# Patient Record
Sex: MALE | Race: OTHER | Employment: FULL TIME | URBAN - METROPOLITAN AREA
[De-identification: names, ages, dates, MRNs, and addresses within clinical notes are randomized per-mention and may not be internally consistent; named-entity substitution may affect disease eponyms.]

---

## 2020-12-05 ENCOUNTER — HOSPITAL ENCOUNTER (EMERGENCY)
Age: 25
Discharge: HOME | End: 2020-12-05
Attending: EMERGENCY MEDICINE

## 2020-12-05 VITALS
HEIGHT: 66 IN | RESPIRATION RATE: 18 BRPM | TEMPERATURE: 98.7 F | DIASTOLIC BLOOD PRESSURE: 82 MMHG | OXYGEN SATURATION: 99 % | BODY MASS INDEX: 24.11 KG/M2 | WEIGHT: 150 LBS | SYSTOLIC BLOOD PRESSURE: 124 MMHG | HEART RATE: 70 BPM

## 2020-12-05 PROCEDURE — 99283 EMERGENCY DEPT VISIT LOW MDM: CPT

## 2020-12-05 PROCEDURE — U0003 INFECTIOUS AGENT DETECTION BY NUCLEIC ACID (DNA OR RNA); SEVERE ACUTE RESPIRATORY SYNDROME CORONAVIRUS 2 (SARS-COV-2) (CORONAVIRUS DISEASE [COVID-19]), AMPLIFIED PROBE TECHNIQUE, MAKING USE OF HIGH THROUGHPUT TECHNOLOGIES AS DESCRIBED BY CMS-2020-01-R: HCPCS

## 2020-12-05 SDOH — HEALTH STABILITY: MENTAL HEALTH: HOW OFTEN DO YOU HAVE A DRINK CONTAINING ALCOHOL?: NEVER

## 2020-12-05 ASSESSMENT — ENCOUNTER SYMPTOMS
COUGH: 0
CONSTIPATION: 0
SHORTNESS OF BREATH: 0
NAUSEA: 0
PHOTOPHOBIA: 0
BLOOD IN STOOL: 0
VOMITING: 0
ABDOMINAL PAIN: 0
TROUBLE SWALLOWING: 0
RHINORRHEA: 0
DIARRHEA: 0
SORE THROAT: 0

## 2020-12-05 NOTE — ED NOTES
Discharge instructions reviewed with patient, no additional comments or concerns.       Soraida Martinez RN  12/05/20 0454

## 2020-12-05 NOTE — ED NOTES
PT presents to the ED ambulatory from home. Pt reports he is concerned that he may have COVID and does not want to return to work until he knows wether he is COVID + or not. Pt denies any shortness of breath cough, chest pain or fevers. PT reports that he has been very fatigues sleeping a lot over the past 2 days and has had loss of taste and smell x2 days.       Lady Martinez, RN  12/05/20 9926

## 2020-12-05 NOTE — ED PROVIDER NOTES
[address] [facility] RESIDENT NOTE       Date of evaluation: 12/5/2020    Chief Complaint     Concern For COVID-19      History of Present Illness     Harjinder Caraballo is a 26 y.o. male who presents with lost of taste and smell. Symptoms started two days ago with fatigue and lost of taste and smell. His neighbor has covid and he works at [employer]. He would like to know if he has covid prior to going back to work. He has no cough, shortness of breath, chest pain. No nausea or vomiting or diarrhea. He thinks he had a fever yesterday. Review of Systems     Review of Systems   Constitutional: Positive for activity change, chills and fatigue. Negative for appetite change and diaphoresis. HENT: Negative for congestion, rhinorrhea, sore throat and trouble swallowing. Eyes: Negative for photophobia and visual disturbance. Respiratory: Negative for cough and shortness of breath. Cardiovascular: Negative for chest pain and palpitations. Gastrointestinal: Negative for abdominal pain, blood in stool, constipation, diarrhea, nausea and vomiting. Genitourinary: Negative for difficulty urinating and dysuria. Musculoskeletal: Negative for arthralgias and myalgias. Skin: Negative for rash. Neurological: Negative for weakness, numbness and headaches. Psychiatric/Behavioral: Negative for self-injury and suicidal ideas. The patient is not nervous/anxious. Past Medical, Surgical, Family, and Social History     He has no past medical history on file. He has no past surgical history on file. His family history is not on file. He reports that he has never smoked. He has never used smokeless tobacco. He reports that he does not drink alcohol or use drugs. Medications     Previous Medications    No medications on file       Allergies     He has no allergies on file.     Physical Exam     INITIAL VITALS: BP: 124/82, Temp: 98.7 °F (37.1 °C), Pulse: 70, Resp: 18, SpO2: 99 %   Physical Exam  Constitutional:       General: He is not in acute distress. Appearance: Normal appearance. He is well-developed. He is not diaphoretic. HENT:      Head: Normocephalic and atraumatic. Nose: Nose normal. No congestion or rhinorrhea. Mouth/Throat:      Mouth: Mucous membranes are moist.   Eyes:      Extraocular Movements: Extraocular movements intact. Conjunctiva/sclera: Conjunctivae normal.      Pupils: Pupils are equal, round, and reactive to light. Neck:      Musculoskeletal: Normal range of motion. No neck rigidity or muscular tenderness. Cardiovascular:      Rate and Rhythm: Normal rate and regular rhythm. Pulmonary:      Effort: Pulmonary effort is normal. No respiratory distress. Breath sounds: Normal breath sounds. Comments: Breathing comfortably on room air   Abdominal:      General: Abdomen is flat. There is no distension. Palpations: Abdomen is soft. Tenderness: There is no abdominal tenderness. Musculoskeletal: Normal range of motion. General: No deformity. Lymphadenopathy:      Cervical: No cervical adenopathy. Skin:     General: Skin is warm. Capillary Refill: Capillary refill takes less than 2 seconds. Findings: No rash. Neurological:      General: No focal deficit present. Mental Status: He is alert and oriented to person, place, and time. Mental status is at baseline. Cranial Nerves: No cranial nerve deficit. Coordination: Coordination normal.         DiagnosticResults         RADIOLOGY:  No orders to display       LABS:   No results found for this visit on 12/05/20. ED BEDSIDE ULTRASOUND:      RECENT VITALS:  BP: 124/82, Temp: 98.7 °F (37.1 °C), Pulse: 70,Resp: 18, SpO2: 99 %     Procedures         ED Course     Nursing Notes, Past Medical Hx, Past Surgical Hx, Social Hx, Allergies, and Family Hx were reviewed.     The patient was given the followingmedications:  No orders of the defined types were placed in this encounter. CONSULTS:  None    MEDICAL DECISION MAKING / ASSESSMENT / PLAN     Nidhi So is a 26 y.o. male who presents with concern for covid-19. On presentation he was in no acute distress and hemodynamically stable. Breathing comfortably on room air. Covid-19 testing done. He was instructed to self quarantine while symptomatic. He was given strict return precautions for worsening symptoms and discharged in stable condition. This patient was also evaluated by the attending physician. All care plans werediscussed and agreed upon. Clinical Impression     1. Clinical diagnosis of COVID-19    2. Loss of taste    3. Fatigue, unspecified type        Disposition     PATIENT REFERRED TO:  No follow-up provider specified.     DISCHARGE MEDICATIONS:  New Prescriptions    No medications on file       Sarmad Galvan MD  Resident  12/05/20 3054

## 2020-12-06 LAB — SARS-COV-2, PCR: DETECTED

## 2020-12-07 ENCOUNTER — CARE COORDINATION (OUTPATIENT)
Dept: CARE COORDINATION | Age: 25
End: 2020-12-07

## 2020-12-07 NOTE — CARE COORDINATION
Patient/family/caregiver given information for [employer] and agrees to enroll yes  Patient's preferred e-mail: [email or web address]   Patient's preferred phone number: [phone]  Based on Loop alert triggers, patient will be contacted by nurse care manager for worsening symptoms. Pt will be further monitored by COVID Loop Team based on severity of symptoms and risk factors.

## 2021-08-14 ENCOUNTER — HOSPITAL ENCOUNTER (EMERGENCY)
Age: 26
Discharge: HOME | End: 2021-08-14
Attending: EMERGENCY MEDICINE

## 2021-08-14 VITALS
TEMPERATURE: 98.2 F | SYSTOLIC BLOOD PRESSURE: 101 MMHG | OXYGEN SATURATION: 99 % | DIASTOLIC BLOOD PRESSURE: 65 MMHG | HEART RATE: 88 BPM | RESPIRATION RATE: 20 BRPM

## 2021-08-14 DIAGNOSIS — J06.9 VIRAL URI: Primary | ICD-10-CM

## 2021-08-14 LAB
MONO TEST: NEGATIVE
RAPID INFLUENZA  B AGN: NEGATIVE
RAPID INFLUENZA A AGN: NEGATIVE
S PYO AG THROAT QL: NEGATIVE

## 2021-08-14 PROCEDURE — 86308 HETEROPHILE ANTIBODY SCREEN: CPT

## 2021-08-14 PROCEDURE — 2580000003 HC RX 258: Performed by: PHYSICIAN ASSISTANT

## 2021-08-14 PROCEDURE — 87077 CULTURE AEROBIC IDENTIFY: CPT

## 2021-08-14 PROCEDURE — 87880 STREP A ASSAY W/OPTIC: CPT

## 2021-08-14 PROCEDURE — 99284 EMERGENCY DEPT VISIT MOD MDM: CPT

## 2021-08-14 PROCEDURE — 6360000002 HC RX W HCPCS: Performed by: PHYSICIAN ASSISTANT

## 2021-08-14 PROCEDURE — 87804 INFLUENZA ASSAY W/OPTIC: CPT

## 2021-08-14 PROCEDURE — 87081 CULTURE SCREEN ONLY: CPT

## 2021-08-14 PROCEDURE — U0003 INFECTIOUS AGENT DETECTION BY NUCLEIC ACID (DNA OR RNA); SEVERE ACUTE RESPIRATORY SYNDROME CORONAVIRUS 2 (SARS-COV-2) (CORONAVIRUS DISEASE [COVID-19]), AMPLIFIED PROBE TECHNIQUE, MAKING USE OF HIGH THROUGHPUT TECHNOLOGIES AS DESCRIBED BY CMS-2020-01-R: HCPCS

## 2021-08-14 PROCEDURE — U0005 INFEC AGEN DETEC AMPLI PROBE: HCPCS

## 2021-08-14 PROCEDURE — 96375 TX/PRO/DX INJ NEW DRUG ADDON: CPT

## 2021-08-14 PROCEDURE — 96374 THER/PROPH/DIAG INJ IV PUSH: CPT

## 2021-08-14 RX ORDER — ACETAMINOPHEN 500 MG
500 TABLET ORAL 4 TIMES DAILY PRN
Qty: 30 TABLET | Refills: 0 | Status: SHIPPED | OUTPATIENT
Start: 2021-08-14 | End: 2022-10-02

## 2021-08-14 RX ORDER — DEXAMETHASONE SODIUM PHOSPHATE 4 MG/ML
10 INJECTION, SOLUTION INTRA-ARTICULAR; INTRALESIONAL; INTRAMUSCULAR; INTRAVENOUS; SOFT TISSUE EVERY 6 HOURS
Status: DISCONTINUED | OUTPATIENT
Start: 2021-08-14 | End: 2021-08-14 | Stop reason: HOSPADM

## 2021-08-14 RX ORDER — SODIUM CHLORIDE, SODIUM LACTATE, POTASSIUM CHLORIDE, CALCIUM CHLORIDE 600; 310; 30; 20 MG/100ML; MG/100ML; MG/100ML; MG/100ML
1000 INJECTION, SOLUTION INTRAVENOUS ONCE
Status: COMPLETED | OUTPATIENT
Start: 2021-08-14 | End: 2021-08-14

## 2021-08-14 RX ORDER — KETOROLAC TROMETHAMINE 30 MG/ML
15 INJECTION, SOLUTION INTRAMUSCULAR; INTRAVENOUS ONCE
Status: COMPLETED | OUTPATIENT
Start: 2021-08-14 | End: 2021-08-14

## 2021-08-14 RX ORDER — IBUPROFEN 600 MG/1
600 TABLET ORAL EVERY 6 HOURS PRN
Qty: 30 TABLET | Refills: 0 | Status: SHIPPED | OUTPATIENT
Start: 2021-08-14 | End: 2022-10-02

## 2021-08-14 RX ADMIN — KETOROLAC TROMETHAMINE 15 MG: 30 INJECTION, SOLUTION INTRAMUSCULAR; INTRAVENOUS at 09:28

## 2021-08-14 RX ADMIN — DEXAMETHASONE SODIUM PHOSPHATE 10 MG: 4 INJECTION, SOLUTION INTRAMUSCULAR; INTRAVENOUS at 09:29

## 2021-08-14 RX ADMIN — SODIUM CHLORIDE, POTASSIUM CHLORIDE, SODIUM LACTATE AND CALCIUM CHLORIDE 1000 ML: 600; 310; 30; 20 INJECTION, SOLUTION INTRAVENOUS at 09:29

## 2021-08-14 ASSESSMENT — ENCOUNTER SYMPTOMS
ABDOMINAL PAIN: 0
SHORTNESS OF BREATH: 0
SORE THROAT: 1
COUGH: 0

## 2021-08-14 ASSESSMENT — PAIN SCALES - GENERAL
PAINLEVEL_OUTOF10: 10
PAINLEVEL_OUTOF10: 9
PAINLEVEL_OUTOF10: 0

## 2021-08-14 ASSESSMENT — PAIN DESCRIPTION - LOCATION: LOCATION: OTHER (COMMENT)

## 2021-08-14 ASSESSMENT — PAIN DESCRIPTION - FREQUENCY: FREQUENCY: CONTINUOUS

## 2021-08-14 ASSESSMENT — PAIN DESCRIPTION - PAIN TYPE: TYPE: ACUTE PAIN

## 2021-08-14 NOTE — ED PROVIDER NOTES
[facility]          PHYSICIAN ASSISTANT NOTE       Date of evaluation: 8/14/2021    Chief Complaint     Fever, Fatigue, and Pharyngitis      History of Present Illness     HPI: Reyes Iverson is a 26 y.o. male with no pertinent past medical history who presents to the emergency department with muscle aches, fevers and sore throat. Patient has had the symptoms since Thursday. He notes that his diffuse body aches make it difficult for him to walk around. With his sore throat he has pain with swallowing though denies any difficulty breathing, eating or drinking. He denies any chest pain, shortness of breath or cough. He does note that his symptoms feel similar to his previous diagnosis of Covid in December 2020. He has not been vaccinated against Covid. He denies any abdominal pain, nausea or vomiting. He denies any decreased appetite, changes in urination or bowel movements. He has not been around anyone sick that he is aware of. No one at home has similar symptoms. With the exception of the above, there are no aggravating or alleviating factors. Review of Systems     Review of Systems   Constitutional: Positive for chills, fatigue and fever. HENT: Positive for sore throat. Negative for congestion. Respiratory: Negative for cough and shortness of breath. Cardiovascular: Negative for chest pain. Gastrointestinal: Negative for abdominal pain. Musculoskeletal: Positive for myalgias. Neurological: Positive for headaches. Negative for dizziness. As stated above, all other systems reviewed and are otherwise negative. Past Medical, Surgical, Family, and Social History     He has no past medical history on file. He has no past surgical history on file. His family history is not on file. He reports that he has never smoked. He has never used smokeless tobacco. He reports previous alcohol use.  He reports that he does not use drugs.    Medications     Discharge Medication List as of 8/14/2021 10:45 AM          Allergies     He has No Known Allergies. Physical Exam     INITIAL VITALS: BP: 123/74, Temp: 101.1 °F (38.4 °C), Pulse: 93, Resp: 22, SpO2: 95 %  Physical Exam  Vitals and nursing note reviewed. Constitutional:       General: He is not in acute distress. Appearance: Normal appearance. He is ill-appearing. He is not toxic-appearing or diaphoretic. HENT:      Head: Normocephalic and atraumatic. Right Ear: External ear normal.      Left Ear: External ear normal.      Nose: Nose normal. No congestion. Mouth/Throat:      Comments: Bilateral tonsillar edema, right greater than left, with midline uvula and handling oral secretions well. Bilateral tonsillar exudates. No trismus. No stridor, clear phonation. Eyes:      Extraocular Movements: Extraocular movements intact. Conjunctiva/sclera: Conjunctivae normal.   Cardiovascular:      Rate and Rhythm: Normal rate. Pulses: Normal pulses. Pulmonary:      Effort: Pulmonary effort is normal. No respiratory distress. Comments: Able to speak in complete sentences without any respiratory distress. Abdominal:      General: Abdomen is flat. There is no distension. Palpations: Abdomen is soft. Tenderness: There is no abdominal tenderness. There is no guarding or rebound. Musculoskeletal:         General: Normal range of motion. Cervical back: Normal range of motion. Right lower leg: No edema. Left lower leg: No edema. Lymphadenopathy:      Cervical: Cervical adenopathy present. Skin:     General: Skin is warm and dry. Neurological:      General: No focal deficit present. Mental Status: He is alert. Mental status is at baseline.    Psychiatric:         Mood and Affect: Mood normal.         Behavior: Behavior normal.         Diagnostic Results     RADIOLOGY:  No orders to display       LABS:   Results for orders placed or performed during the hospital encounter of 08/14/21   Strep Screen Group A Throat    Specimen: Throat   Result Value Ref Range    Rapid Strep A Screen Negative Negative   Rapid influenza A/B antigens    Specimen: Nasopharyngeal   Result Value Ref Range    Rapid Influenza A Ag Negative Negative    Rapid Influenza B Ag Negative Negative   Mononucleosis screen   Result Value Ref Range    Mono Test Negative Negative       RECENT VITALS:  BP: 101/65, Temp: 98.2 °F (36.8 °C), Pulse: 88, Resp: 20, SpO2: 99 %     Procedures     n/a    ED Course     Nursing Notes, Past Medical Hx,Past Surgical Hx, Social Hx, Allergies, and Family Hx were reviewed. The patient was given the following medications:  Orders Placed This Encounter   Medications    ketorolac (TORADOL) injection 15 mg    DISCONTD: dexamethasone (DECADRON) injection 10 mg    lactated ringers infusion 1,000 mL    ibuprofen (ADVIL;MOTRIN) 600 MG tablet     Sig: Take 1 tablet by mouth every 6 hours as needed for Pain     Dispense:  30 tablet     Refill:  0    acetaminophen (TYLENOL) 500 MG tablet     Sig: Take 1 tablet by mouth 4 times daily as needed for Pain     Dispense:  30 tablet     Refill:  0       CONSULTS:  None    MEDICAL DECISION MAKING / ASSESSMENT / PLAN     Vitals:    08/14/21 0903 08/14/21 0939 08/14/21 1030   BP: 123/74 120/70 101/65   Pulse: 93 83 88   Resp: 22 18 20   Temp: 101.1 °F (38.4 °C)  98.2 °F (36.8 °C)   TempSrc: Oral     SpO2: 95% 97% 99%       Carolina Garrett is a 26 y.o. male who presents to the emergency department with sore throat, myalgias and fevers. Patient symptoms have been ongoing since Thursday. He notes that his symptoms feel similar to his previous Covid diagnosis in December of last year. He denies any associated cough, chest pain or shortness of breath. His sore throat has made it painful for him to eat that he denies any difficulty eating, breathing or swallowing. He has not been vaccinated against Covid.   He denies any known sick contacts. Upon arrival the patient is febrile to 101.1 °F that was otherwise hemodynamically stable. He is ill in appearance though nontoxic with bilateral tonsillar enlargement with exudates. He he is handling oral secretions well with a midline uvula. For symptom control patient was given IV fluids, Decadron and Toradol. Patient was tested for mono, Covid, influenza and strep. On reevaluation patient notes that he is feeling significantly better. Patient's Covid, influenza and strep swabs were all negative. I do not feel the patient requires lab work at this time as his symptoms are consistent with a viral upper respiratory infection. It is possible that patient has an additional Covid infection and he was advised to stay home until this test result comes back. I discussed all this with the patient who is agreeable to the plan for discharge home with strict return precautions and close follow-up. I do not believe that this patient requires any further work-up or inpatient management for their complaints, and are appropriate for outpatient follow-up. I discussed the findings of my physical exam and work-up with the patient, and they were given the opportunity to ask questions. The patient is agreeable with the plan and is ready to be discharged home. The patient was discharged home with prescriptions for Tylenol and Ibuprofen. Patient instructed to follow-up with PCP as soon as possible, and given strict return precautions. The patient was evaluated by myself and the ED Attending Physician, Dr. Jacob Montano. All management and disposition plans were discussed and agreed upon. Clinical Impression     1. Viral URI      This note was dictated using voice-recognition software, which occasionally leads to inadvertent typographic errors.     Disposition     PATIENT REFERRED TO:  [address] [address]  [phone]    Schedule an appointment as soon as possible for a visit   To establish care with a primary care provider      DISCHARGE MEDICATIONS:  Discharge Medication List as of 8/14/2021 10:45 AM      START taking these medications    Details   ibuprofen (ADVIL;MOTRIN) 600 MG tablet Take 1 tablet by mouth every 6 hours as needed for Pain, Disp-30 tablet, R-0Print      acetaminophen (TYLENOL) 500 MG tablet Take 1 tablet by mouth 4 times daily as needed for Pain, Disp-30 tablet, R-0Print             DISPOSITION  - Discharge.       BENEDICTO Quintero  08/14/21 143

## 2021-08-14 NOTE — ED PROVIDER NOTES
ED Attending Attestation Note     Date of evaluation: 8/14/2021    This patient was seen by the advance practice provider. I have seen and examined the patient, agree with the workup, evaluation, management and diagnosis. The care plan has been discussed. My assessment reveals exudative pharyngitis without PTA.      Saba Estes MD  08/14/21 7256

## 2021-08-14 NOTE — ED TRIAGE NOTES
24y/o male presents to the ED with weakness, fatigue, and pharyngitis. Pt states onset last two days. +fever at home.

## 2021-08-15 LAB — SARS-COV-2, PCR: NOT DETECTED

## 2021-08-16 LAB
ORGANISM: ABNORMAL
S PYO THROAT QL CULT: ABNORMAL
S PYO THROAT QL CULT: ABNORMAL

## 2022-10-02 ENCOUNTER — HOSPITAL ENCOUNTER (EMERGENCY)
Age: 27
Discharge: HOME | End: 2022-10-02
Attending: STUDENT IN AN ORGANIZED HEALTH CARE EDUCATION/TRAINING PROGRAM

## 2022-10-02 VITALS
HEIGHT: 71 IN | TEMPERATURE: 98.2 F | OXYGEN SATURATION: 97 % | DIASTOLIC BLOOD PRESSURE: 83 MMHG | HEART RATE: 80 BPM | RESPIRATION RATE: 18 BRPM | WEIGHT: 146 LBS | BODY MASS INDEX: 20.44 KG/M2 | SYSTOLIC BLOOD PRESSURE: 130 MMHG

## 2022-10-02 DIAGNOSIS — J06.9 VIRAL URI WITH COUGH: Primary | ICD-10-CM

## 2022-10-02 LAB
INFLUENZA A: NOT DETECTED
INFLUENZA B: NOT DETECTED
SARS-COV-2 RNA, RT PCR: NOT DETECTED

## 2022-10-02 PROCEDURE — 6370000000 HC RX 637 (ALT 250 FOR IP)

## 2022-10-02 PROCEDURE — 99283 EMERGENCY DEPT VISIT LOW MDM: CPT

## 2022-10-02 PROCEDURE — 87636 SARSCOV2 & INF A&B AMP PRB: CPT

## 2022-10-02 RX ORDER — LIDOCAINE HYDROCHLORIDE 20 MG/ML
15 SOLUTION OROPHARYNGEAL
Status: DISCONTINUED | OUTPATIENT
Start: 2022-10-02 | End: 2022-10-02 | Stop reason: HOSPADM

## 2022-10-02 RX ORDER — ACETAMINOPHEN 500 MG
1000 TABLET ORAL
Status: COMPLETED | OUTPATIENT
Start: 2022-10-02 | End: 2022-10-02

## 2022-10-02 RX ORDER — IBUPROFEN 400 MG/1
800 TABLET ORAL ONCE
Status: COMPLETED | OUTPATIENT
Start: 2022-10-02 | End: 2022-10-02

## 2022-10-02 RX ADMIN — IBUPROFEN 800 MG: 400 TABLET, FILM COATED ORAL at 09:18

## 2022-10-02 RX ADMIN — ACETAMINOPHEN 1000 MG: 500 TABLET ORAL at 09:17

## 2022-10-02 RX ADMIN — LIDOCAINE HYDROCHLORIDE 15 ML: 20 SOLUTION ORAL; TOPICAL at 09:22

## 2022-10-02 ASSESSMENT — ENCOUNTER SYMPTOMS
SHORTNESS OF BREATH: 0
ABDOMINAL PAIN: 0
EYE PAIN: 0
DIARRHEA: 0
WHEEZING: 0
RHINORRHEA: 1
EYE ITCHING: 0
VOICE CHANGE: 0
SORE THROAT: 1
BACK PAIN: 0
NAUSEA: 0
CONSTIPATION: 0
COUGH: 1
EYE DISCHARGE: 0

## 2022-10-02 ASSESSMENT — PAIN DESCRIPTION - FREQUENCY: FREQUENCY: CONTINUOUS

## 2022-10-02 ASSESSMENT — PAIN DESCRIPTION - DESCRIPTORS: DESCRIPTORS: SORE

## 2022-10-02 ASSESSMENT — PAIN - FUNCTIONAL ASSESSMENT: PAIN_FUNCTIONAL_ASSESSMENT: 0-10

## 2022-10-02 ASSESSMENT — PAIN DESCRIPTION - ONSET: ONSET: ON-GOING

## 2022-10-02 ASSESSMENT — PAIN DESCRIPTION - PAIN TYPE: TYPE: ACUTE PAIN

## 2022-10-02 ASSESSMENT — PAIN SCALES - GENERAL: PAINLEVEL_OUTOF10: 8

## 2022-10-02 ASSESSMENT — PAIN DESCRIPTION - LOCATION: LOCATION: THROAT

## 2022-10-02 NOTE — ED PROVIDER NOTES
ED Attending Attestation Note     Date of evaluation: 10/2/2022    This patient was seen by the advance practice provider and staffed with me. However, he eloped prior to my evaluation and before completing therapies and lab work ordered by the advanced practice provider. Briefly, patient presents with mild viral URI symptoms, predominantly pharyngitis, and has normal vitals without respiratory distress here.      Aydin Hay MD  10/02/22 1001

## 2023-07-24 NOTE — ED PROVIDER NOTES
ED Attending Attestation Note     Date of evaluation: 12/5/2020    This patient was seen by the resident, Dr. Margie Fritz. I have seen and examined the patient, agree with the workup, evaluation, management and diagnosis. The care plan has been discussed. This is an otherwise healthy 80-year-old male that comes in today with generalized weakness. He also endorses loss of taste and smell for the last 2 days. Also states he had a decreased appetite eating 1 meal a day. Denies any diarrhea. States he has a neighbor with a firm positive COVID-19. He states that he did have a fever last night. On exam, the patient is well-appearing, no acute distress. He is not hypoxic on room air. Lung sounds are clear and equal bilaterally. He has a soft abdomen. He has no calf tenderness. It is possible that the patient does have COVID-19, but he could also have other viral syndromes including influenza. He states he did not get a flu shot this year. We will test for COVID-19, I told the patient that he is stable for discharge home with return back if he has worsening symptoms. He was also told to self quarantine until results become available. Please see resident note for reassessment and final disposition.       Ilda Antony MD  12/05/20 [id number]

## 2023-12-23 NOTE — ED PROVIDER NOTES
[address] ENCOUNTER          PHYSICIAN ASSISTANT NOTE       Date of evaluation: 10/2/2022    Chief Complaint     Pharyngitis and Cough (Pt reports sore throat and cough since Monday. )      History of Present Illness     Magi Perez is a 23 y.o. male with a history of tetralogy of Fallot that follows with  cardiology status post repair at 4years old who presents to the emergency department today with cough, sore throat, rhinorrhea, congestion, fatigue, malaise. The patient states that his symptoms started 6 days ago, last Monday. He also states that he had body aches for the first 2 days. He denies any fevers, chills, shortness of breath, chest pain, abdominal pain, nausea, vomiting, diarrhea. He states that he is still eating, however has had diminished oral intake especially over the last 2 days secondary to a sore throat, which is his most bothersome symptom. He has been taking Tylenol without improvement of his symptoms. He denies any specific known sick contacts. Regarding his history of tetralogy of Fallot, the patient states that he follows with  cardiology and has had an echocardiogram within the past 2 years and states that everything is doing okay regarding this:       1. Tetralogy of Fallot, reported full repair with VSD closure and transannular patch in [place], age 4 years (2000). 2. Small to moderate anterior VSD patch leak (dimension 8-10 mm), shunting all left to right with a peak gradient of 68 mmHg. 3. Mild pulmonary valve stenosis. 4. Free pulmonary valve regurgitation. 5. Mild left branch pulmonary artery stenosis. 6. LPA is mildly stenotic. 7. Mild tricuspid valve regurgitation. 8. Right ventricle is moderately dilated and the systolic function is mildly diminished. 9. Left ventricle is mildly dilated and the systolic function is mildly diminished. 10. Left sided aortic arch with normal branching. 11.  The ascending aorta, transverse arch and descending aorta are unobstructed. 12. No pericardial effusion. Review of Systems     Review of Systems   Constitutional:  Positive for fatigue. Negative for chills and fever. HENT:  Positive for congestion, rhinorrhea and sore throat. Negative for voice change. Eyes:  Negative for pain, discharge and itching. Respiratory:  Positive for cough. Negative for shortness of breath and wheezing. Cardiovascular:  Negative for chest pain, palpitations and leg swelling. Gastrointestinal:  Negative for abdominal pain, constipation, diarrhea and nausea. Genitourinary:  Negative for dysuria, flank pain and hematuria. Musculoskeletal:  Positive for myalgias. Negative for back pain and joint swelling. Neurological:  Negative for dizziness, syncope and weakness. Psychiatric/Behavioral:  Negative for confusion. Past Medical, Surgical, Family, and Social History     He has no past medical history on file. He has no past surgical history on file. His family history is not on file. He reports that he has never smoked. He has never used smokeless tobacco. He reports that he does not currently use alcohol. He reports that he does not use drugs. Medications     Previous Medications    No medications on file       Allergies     He has No Known Allergies. Physical Exam     INITIAL VITALS: BP: 130/83, Temp: 98.2 °F (36.8 °C), Heart Rate: 80, Resp: 18, SpO2: 97 %  Physical Exam  Constitutional:       General: He is not in acute distress. Appearance: Normal appearance. He is normal weight. He is not ill-appearing, toxic-appearing or diaphoretic. HENT:      Head: Normocephalic and atraumatic. Right Ear: Tympanic membrane, ear canal and external ear normal.      Left Ear: Tympanic membrane, ear canal and external ear normal.      Nose: Congestion present. Mouth/Throat:      Mouth: Mucous membranes are moist.      Comments: Mild erythema in the posterior pharynx. No significant tonsillar swelling, tonsillar exudates, uvula midline, tolerating secretions well, voice normal, no trismus  Eyes:      Extraocular Movements: Extraocular movements intact. Pupils: Pupils are equal, round, and reactive to light. Cardiovascular:      Rate and Rhythm: Normal rate and regular rhythm. Heart sounds: Murmur (IV/VI systolic murmur, loudest at the left lower sternal border. Also II/VI diastolic murmur appreciated at the left lower sternal border) heard. Pulmonary:      Effort: Pulmonary effort is normal. No respiratory distress. Breath sounds: Normal breath sounds. No wheezing, rhonchi or rales. Abdominal:      General: Abdomen is flat. Bowel sounds are normal. There is no distension. Palpations: Abdomen is soft. Tenderness: There is no abdominal tenderness. There is no guarding or rebound. Musculoskeletal:      Cervical back: Normal range of motion and neck supple. Right lower leg: No edema. Left lower leg: No edema. Lymphadenopathy:      Cervical: No cervical adenopathy. Skin:     Capillary Refill: Capillary refill takes less than 2 seconds. Findings: No rash. Neurological:      Mental Status: He is alert and oriented to person, place, and time. Psychiatric:         Mood and Affect: Mood normal.         Behavior: Behavior normal.       Diagnostic Results     RADIOLOGY:  No orders to display       LABS:   No results found for this visit on 10/02/22. ED BEDSIDE ULTRASOUND:  No results found. RECENT VITALS:  BP: 130/83, Temp: 98.2 °F (36.8 °C), Heart Rate: 80, Resp: 18, SpO2: 97 %     Procedures     none    ED Course     Nursing Notes, Past Medical Hx,Past Surgical Hx, Social Hx, Allergies, and Family Hx were reviewed.          The patient was given the following medications:  Orders Placed This Encounter   Medications    acetaminophen (TYLENOL) tablet 1,000 mg    ibuprofen (ADVIL;MOTRIN) tablet 800 mg    lidocaine viscous hcl (XYLOCAINE) 2 % solution 15 mL       CONSULTS:  None    MEDICAL DECISION MAKING / ASSESSMENT / Aaliyah Ronny is a 23 y.o. male that presents with congestion, rhinorrhea, cough, myalgias, sore throat that started about 6 days ago. Of note, he does have a history of tetralogy of Fallot s/p repair at 4years old. On my exam, the patient is hemodynamically stable and afebrile. He is in no acute distress. He is overall very well-appearing. HEENT exam shows some mild posterior pharyngeal erythema but no other significant findings. He does have murmur consistent with his history of tetralogy of Fallot with residual VSD. I highly suspect that he does have a viral syndrome contributing to his symptoms. He will be treated with ibuprofen, Tylenol and viscous lidocaine for his sore throat. I did offer a chest x-ray to further evaluate his cough, however the patient declined as he did not want to wait for this result. I had a respond of it discussion with him and I think that this is reasonable given I do have a low suspicion of pneumonia at this time. He however was given very strict return precautions regarding this. I also did obtain COVID-19 and influenza test.  Unfortunately, before the COVID-19/influenza test resulted (it was sent), the patient did elope from the emergency department. I did staff this patient with my attending but he unfortunately did not have time to evaluate the patient before he eloped. This patient was also evaluated by the attending physician. All care plans were discussed and agreed upon. Clinical Impression     1. Viral URI with cough        Disposition     PATIENT REFERRED TO:  No follow-up provider specified.     DISCHARGE MEDICATIONS:  New Prescriptions    No medications on file       DISPOSITION Eloped - Left Before Treatment Complete 10/02/2022 09:47:13 AM       BENEDICTO Smiley  10/02/22 BENEDICTO Velazco  10/02/22 1002 (2) Male